# Patient Record
Sex: FEMALE | Race: WHITE | Employment: FULL TIME | URBAN - METROPOLITAN AREA
[De-identification: names, ages, dates, MRNs, and addresses within clinical notes are randomized per-mention and may not be internally consistent; named-entity substitution may affect disease eponyms.]

---

## 2020-01-20 ENCOUNTER — OFFICE VISIT (OUTPATIENT)
Dept: ORTHOPEDIC SURGERY | Age: 30
End: 2020-01-20

## 2020-01-20 VITALS
HEART RATE: 90 BPM | HEIGHT: 66 IN | SYSTOLIC BLOOD PRESSURE: 112 MMHG | TEMPERATURE: 98.8 F | DIASTOLIC BLOOD PRESSURE: 73 MMHG | WEIGHT: 175 LBS | BODY MASS INDEX: 28.12 KG/M2 | RESPIRATION RATE: 15 BRPM

## 2020-01-20 DIAGNOSIS — S76.012A SPRAIN, GLUTEUS MEDIUS, LEFT, INITIAL ENCOUNTER: Primary | ICD-10-CM

## 2020-01-20 RX ORDER — DEXTROAMPHETAMINE SACCHARATE, AMPHETAMINE ASPARTATE, DEXTROAMPHETAMINE SULFATE AND AMPHETAMINE SULFATE 5; 5; 5; 5 MG/1; MG/1; MG/1; MG/1
20 TABLET ORAL 2 TIMES DAILY
COMMUNITY

## 2020-01-20 RX ORDER — MELOXICAM 15 MG/1
TABLET ORAL
Qty: 90 TAB | Refills: 0 | Status: SHIPPED | OUTPATIENT
Start: 2020-01-20

## 2020-01-20 NOTE — PROGRESS NOTES
HISTORY OF PRESENT ILLNESS    Lakhwinder Higgins is a 26y.o. year old female comes in today as new patient for: back pain    Patients symptoms have been present for off and on since teenager. Worse in last 1 year plus. Pain level 10 - Worst pain ever/10 lower radiates up and then into right leg. It has worsened with walking or sitting for long periods. Patient has tried:  nothing as no insurance. It is described as pain in lower back and npo known injury but will get periods of severe pain but much better the last 3 weeks. Past Surgical History:   Procedure Laterality Date    HX HERNIA REPAIR      HX OTHER SURGICAL      face surgery    HX OTHER SURGICAL      hernia ovary    HX REFRACTIVE SURGERY       Social History     Socioeconomic History    Marital status: SINGLE     Spouse name: Not on file    Number of children: Not on file    Years of education: Not on file    Highest education level: Not on file   Tobacco Use    Smoking status: Current Every Day Smoker     Packs/day: 1.00    Smokeless tobacco: Never Used   Substance and Sexual Activity    Alcohol use: Yes     Comment: socially    Drug use: No      Current Outpatient Medications   Medication Sig Dispense Refill    dextroamphetamine-amphetamine (ADDERALL) 20 mg tablet Take 20 mg by mouth two (2) times a day. Past Medical History:   Diagnosis Date    ADHD      Family History   Problem Relation Age of Onset    Cancer Maternal Grandmother     Cancer Paternal Grandmother     Cancer Paternal Grandfather          ROS:  Will get numb/tigle right leg but none now. No incont, fever. All other systems reviewed and negative aside from that written in the HPI. Objective:  /73   Pulse 90   Temp 98.8 °F (37.1 °C)   Resp 15   Ht 5' 6\" (1.676 m)   Wt 175 lb (79.4 kg)   BMI 28.25 kg/m²   GEN:  Appears stated age in NAD. HEAD:  Normocephalic, Atraumatic. NEURO:  Sensation intact to light touch.   Reflexes +2/4 patellar and Achilles bilaterally. M/S: Examined standing and supine. Slump negative. LE Strength +5/5 bilaterally Scour negative bilateral  JONE negative bilateral .  Internal rotation normal. bilaterally  negative TTP over greater trochanter. Piriformis normal right but TTP glute medius right Megha's test negative bilateral  Sage test negative for hip flexor and quad bilateral   EXT: no clubbing/cyanosis. no edema. SKIN: Warm/dry without rash. HEENT: Conjunctiva/lids WNL. External canals/nares WNL. Tongue midline. PERRL, EOMI. Hearing intact. NECK: Trachea midline. Supple, Full ROM. No thyromegaly. CARDIAC: No edema. LUNGS: Normal effort. ABD: Soft, no masses. No HSM. PSYCH: A+O x3. Appropriate judgment and insight. Assessment/Plan:     ICD-10-CM ICD-9-CM    1. Sprain, gluteus medius, left, initial encounter S76.012A 843.8 meloxicam (MOBIC) 15 mg tablet       Patient (or guardian if minor) verbalizes understanding of evaluation and plan. Will stretch as demo and use mobic PRN and RTC 6 weeks.

## 2020-01-20 NOTE — PATIENT INSTRUCTIONS
Perform stretches 2 - 5 times daily, use medication as prescribed, and return to the office in about 6 weeks. Search YouTube for my channel: 
 
Dr. Surekha Chino Low back/Young

## 2020-04-02 ENCOUNTER — TELEPHONE (OUTPATIENT)
Dept: ORTHOPEDIC SURGERY | Age: 30
End: 2020-04-02

## 2023-05-22 RX ORDER — MELOXICAM 15 MG/1
TABLET ORAL
COMMUNITY
Start: 2020-01-20

## 2023-05-22 RX ORDER — DEXTROAMPHETAMINE SACCHARATE, AMPHETAMINE ASPARTATE, DEXTROAMPHETAMINE SULFATE AND AMPHETAMINE SULFATE 5; 5; 5; 5 MG/1; MG/1; MG/1; MG/1
20 TABLET ORAL 2 TIMES DAILY
COMMUNITY